# Patient Record
Sex: MALE | Race: WHITE | NOT HISPANIC OR LATINO | Employment: OTHER | URBAN - METROPOLITAN AREA
[De-identification: names, ages, dates, MRNs, and addresses within clinical notes are randomized per-mention and may not be internally consistent; named-entity substitution may affect disease eponyms.]

---

## 2021-01-15 DIAGNOSIS — Z23 NEED FOR VACCINATION: ICD-10-CM

## 2021-07-20 ENCOUNTER — HOSPITAL ENCOUNTER (OUTPATIENT)
Dept: PULMONOLOGY | Facility: MEDICAL CENTER | Age: 71
End: 2021-07-20
Attending: NURSE PRACTITIONER
Payer: COMMERCIAL

## 2021-07-20 PROCEDURE — 94726 PLETHYSMOGRAPHY LUNG VOLUMES: CPT

## 2021-07-20 PROCEDURE — 94729 DIFFUSING CAPACITY: CPT

## 2021-07-20 PROCEDURE — 94726 PLETHYSMOGRAPHY LUNG VOLUMES: CPT | Mod: 26 | Performed by: INTERNAL MEDICINE

## 2021-07-20 PROCEDURE — 94060 EVALUATION OF WHEEZING: CPT | Mod: 26 | Performed by: INTERNAL MEDICINE

## 2021-07-20 PROCEDURE — 94729 DIFFUSING CAPACITY: CPT | Mod: 26 | Performed by: INTERNAL MEDICINE

## 2021-07-20 PROCEDURE — 94060 EVALUATION OF WHEEZING: CPT

## 2021-07-20 RX ORDER — ALBUTEROL SULFATE 90 UG/1
2 AEROSOL, METERED RESPIRATORY (INHALATION)
Status: DISCONTINUED | OUTPATIENT
Start: 2021-07-20 | End: 2021-07-21 | Stop reason: HOSPADM

## 2021-07-20 ASSESSMENT — PULMONARY FUNCTION TESTS
FEV1/FVC_PERCENT_CHANGE: 0
FEV1: 3.12
FEV1/FVC_PERCENT_PREDICTED: 111
FEV1/FVC: 83.65
FEV1/FVC: 83.58
FEV1: 3.28
FEV1/FVC_PERCENT_LLN: 62.80
FEV1/FVC_PREDICTED: 75.21
FEV1_LLN: 2.76
FEV1_PERCENT_PREDICTED: 99
FEV1/FVC_PERCENT_PREDICTED: 111
FEV1/FVC: 83.72
FVC_PERCENT_PREDICTED: 84
FEV1_PERCENT_CHANGE: -4
FVC_PREDICTED: 4.43
FEV1_PREDICTED: 3.31
FVC: 3.92
FVC_PERCENT_PREDICTED: 88
FEV1_PERCENT_PREDICTED: 94
FEV1_PERCENT_CHANGE: -5
FEV1/FVC_PERCENT_PREDICTED: 113
FVC_LLN: 3.70
FEV1_LLN: 2.76
FEV1/FVC_PERCENT_LLN: 62.80
FEV1/FVC_PERCENT_PREDICTED: 112
FVC: 3.73
FEV1/FVC_PERCENT_PREDICTED: 75
FVC_LLN: 3.70
FEV1/FVC: 84
FEV1/FVC_PERCENT_CHANGE: 80

## 2021-07-25 NOTE — PROCEDURES
DATE OF SERVICE:  07/20/2021     PULMONARY FUNCTION TEST INTERPRETATION     REQUESTING PROVIDER:  JESSICA Eagle     REASON FOR REQUEST:  Dyspnea on exertion.     INTERPRETATION:  1.  Acceptable and reproducible.  2.  FEV1 3.28 liters (99%), FVC 3.92 liters (88%), ratio 83%.  3.  Flow volume loops normal appearing.  4.  TLC 7.33 liters (98%).  5.  DLCO 16.84 mL per minute mmHg (62%).     IMPRESSION:  Normal spirometry.  No response to bronchodilator.  Normal total   lung capacity.  Moderate reduction in gas transfer.  Clinically correlate with   underlying heart disease.        ______________________________  MD NEFTALY Moss/ASTISH    DD:  07/24/2021 17:20  DT:  07/24/2021 18:23    Job#:  [id number]    CC:JESSICA Eagle

## 2021-08-03 ENCOUNTER — HOSPITAL ENCOUNTER (EMERGENCY)
Facility: MEDICAL CENTER | Age: 71
End: 2021-08-03
Attending: EMERGENCY MEDICINE
Payer: COMMERCIAL

## 2021-08-03 ENCOUNTER — APPOINTMENT (OUTPATIENT)
Dept: RADIOLOGY | Facility: MEDICAL CENTER | Age: 71
End: 2021-08-03
Attending: EMERGENCY MEDICINE
Payer: COMMERCIAL

## 2021-08-03 VITALS
HEIGHT: 78 IN | DIASTOLIC BLOOD PRESSURE: 68 MMHG | OXYGEN SATURATION: 92 % | SYSTOLIC BLOOD PRESSURE: 113 MMHG | TEMPERATURE: 97.3 F | HEART RATE: 69 BPM | WEIGHT: 180 LBS | RESPIRATION RATE: 19 BRPM | BODY MASS INDEX: 20.83 KG/M2

## 2021-08-03 DIAGNOSIS — N28.9 RENAL INSUFFICIENCY: ICD-10-CM

## 2021-08-03 DIAGNOSIS — S30.1XXA CONTUSION OF FLANK, INITIAL ENCOUNTER: ICD-10-CM

## 2021-08-03 DIAGNOSIS — Z79.01 ADEQUATE ANTICOAGULATION ON ANTICOAGULANT THERAPY: ICD-10-CM
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
## 2021-08-03 LAB
ALBUMIN SERPL BCP-MCNC: 4.1 G/DL (ref 3.2–4.9)
ALBUMIN/GLOB SERPL: 1.3 G/DL
ALP SERPL-CCNC: 95 U/L (ref 30–99)
ALT SERPL-CCNC: 9 U/L (ref 2–50)
ANION GAP SERPL CALC-SCNC: 12 MMOL/L (ref 7–16)
AST SERPL-CCNC: 9 U/L (ref 12–45)
BASOPHILS # BLD AUTO: 0.6 % (ref 0–1.8)
BASOPHILS # BLD: 0.06 K/UL (ref 0–0.12)
BILIRUB SERPL-MCNC: 0.3 MG/DL (ref 0.1–1.5)
BUN SERPL-MCNC: 33 MG/DL (ref 8–22)
CALCIUM SERPL-MCNC: 9.6 MG/DL (ref 8.5–10.5)
CHLORIDE SERPL-SCNC: 106 MMOL/L (ref 96–112)
CO2 SERPL-SCNC: 21 MMOL/L (ref 20–33)
CREAT SERPL-MCNC: 2.29 MG/DL (ref 0.5–1.4)
EOSINOPHIL # BLD AUTO: 0.33 K/UL (ref 0–0.51)
EOSINOPHIL NFR BLD: 3.4 % (ref 0–6.9)
ERYTHROCYTE [DISTWIDTH] IN BLOOD BY AUTOMATED COUNT: 44.4 FL (ref 35.9–50)
GLOBULIN SER CALC-MCNC: 3.1 G/DL (ref 1.9–3.5)
GLUCOSE SERPL-MCNC: 191 MG/DL (ref 65–99)
HCT VFR BLD AUTO: 35.7 % (ref 42–52)
HGB BLD-MCNC: 11.7 G/DL (ref 14–18)
IMM GRANULOCYTES # BLD AUTO: 0.05 K/UL (ref 0–0.11)
IMM GRANULOCYTES NFR BLD AUTO: 0.5 % (ref 0–0.9)
LYMPHOCYTES # BLD AUTO: 2.7 K/UL (ref 1–4.8)
LYMPHOCYTES NFR BLD: 28.1 % (ref 22–41)
MCH RBC QN AUTO: 28.8 PG (ref 27–33)
MCHC RBC AUTO-ENTMCNC: 32.8 G/DL (ref 33.7–35.3)
MCV RBC AUTO: 87.9 FL (ref 81.4–97.8)
MONOCYTES # BLD AUTO: 0.68 K/UL (ref 0–0.85)
MONOCYTES NFR BLD AUTO: 7.1 % (ref 0–13.4)
NEUTROPHILS # BLD AUTO: 5.8 K/UL (ref 1.82–7.42)
NEUTROPHILS NFR BLD: 60.3 % (ref 44–72)
NRBC # BLD AUTO: 0 K/UL
NRBC BLD-RTO: 0 /100 WBC
PLATELET # BLD AUTO: 162 K/UL (ref 164–446)
PMV BLD AUTO: 11.4 FL (ref 9–12.9)
POTASSIUM SERPL-SCNC: 4.7 MMOL/L (ref 3.6–5.5)
PROT SERPL-MCNC: 7.2 G/DL (ref 6–8.2)
RBC # BLD AUTO: 4.06 M/UL (ref 4.7–6.1)
SODIUM SERPL-SCNC: 139 MMOL/L (ref 135–145)
WBC # BLD AUTO: 9.6 K/UL (ref 4.8–10.8)

## 2021-08-03 PROCEDURE — 700111 HCHG RX REV CODE 636 W/ 250 OVERRIDE (IP): Performed by: EMERGENCY MEDICINE

## 2021-08-03 PROCEDURE — 74176 CT ABD & PELVIS W/O CONTRAST: CPT

## 2021-08-03 PROCEDURE — 80053 COMPREHEN METABOLIC PANEL: CPT

## 2021-08-03 PROCEDURE — 99284 EMERGENCY DEPT VISIT MOD MDM: CPT

## 2021-08-03 PROCEDURE — 96374 THER/PROPH/DIAG INJ IV PUSH: CPT

## 2021-08-03 PROCEDURE — 85025 COMPLETE CBC W/AUTO DIFF WBC: CPT

## 2021-08-03 RX ORDER — OXYCODONE HYDROCHLORIDE AND ACETAMINOPHEN 5; 325 MG/1; MG/1
1-2 TABLET ORAL EVERY 6 HOURS PRN
Qty: 16 TABLET | Refills: 0 | Status: SHIPPED | OUTPATIENT
Start: 2021-08-03 | End: 2021-08-07

## 2021-08-03 RX ADMIN — FENTANYL CITRATE 100 MCG: 50 INJECTION INTRAMUSCULAR; INTRAVENOUS at 06:24

## 2021-08-03 ASSESSMENT — ENCOUNTER SYMPTOMS
FALLS: 1
FEVER: 0
VOMITING: 0
HEADACHES: 0
CHILLS: 0
ABDOMINAL PAIN: 0
NAUSEA: 0
DOUBLE VISION: 0
BACK PAIN: 1
NECK PAIN: 0
SHORTNESS OF BREATH: 0

## 2021-08-03 NOTE — ED PROVIDER NOTES
"ED Provider Note    ED Provider Note    Primary care provider: MIRIAM Eagle  Means of arrival: EMS  History obtained from: patient, EMS  History limited by: None    CHIEF COMPLAINT  Chief Complaint   Patient presents with   • T-5000 FALL   • Hip Pain       HPI  Phil Sarkar is a 71 y.o. male who presents to the Emergency Department with a chief complaint of a fall.  Patient was in his normal state of health.  He typically walks with a cane.  He thought he was going to sit back on his bed but he missed the bed and struck his right low back/flank.  He did not hit his head.  There was no loss of consciousness.  He does not have a headache or neck pain.  He was unable to get up and walk after this because of the pain to this area.  He denies pain to his lower extremities or hips.  He presents via EMS.  He had a blood sugar in the 90s prior to arrival.  He was given 100 mcg of fentanyl with good results.  An IV was established prior to arrival.  Patient denies any visual change, nausea or vomiting.  No recent fever cough or cold symptoms.  No chest pain or shortness of breath.  He feels comfortable in the upright position on the gurney.  Per EMS report and patient echoes this, he is on Eliquis.    REVIEW OF SYSTEMS  Review of Systems   Constitutional: Negative for chills and fever.   HENT: Negative for congestion.    Eyes: Negative for double vision.   Respiratory: Negative for shortness of breath.    Cardiovascular: Negative for chest pain.   Gastrointestinal: Negative for abdominal pain, nausea and vomiting.   Musculoskeletal: Positive for back pain and falls. Negative for neck pain.   Neurological: Negative for headaches.   All other systems reviewed and are negative.      PAST MEDICAL HISTORY   DM II, Pacemaker, \"kidney problems, GFR 26\"    SURGICAL HISTORY  patient denies any surgical history    SOCIAL HISTORY  Social History     Tobacco Use   • Smoking status: Not on file   Substance Use Topics " "  • Alcohol use: Not on file   • Drug use: Not on file      Social History     Substance and Sexual Activity   Drug Use Not on file       FAMILY HISTORY  No family history on file.    CURRENT MEDICATIONS  Home Medications     Reviewed by Georges Jarvis R.N. (Registered Nurse) on 08/03/21 at 0851  Med List Status: Complete   Medication Last Dose Status        Patient Miko Taking any Medications                       ALLERGIES  No Known Allergies    PHYSICAL EXAM  VITAL SIGNS: /68   Pulse 69   Temp 36.3 °C (97.3 °F) (Temporal)   Resp 19   Ht 2.007 m (6' 7\")   Wt 81.6 kg (180 lb)   SpO2 92%   BMI 20.28 kg/m²   Vitals reviewed.  Constitutional: Patient is oriented to person, place, and time. Appears well-developed and well-nourished. Mild distress.    Head: Normocephalic and atraumatic.   Ears: Normal external ears bilaterally.   Mouth/Throat: Oropharynx is clear and moist  Eyes: Conjunctivae are normal. Pupils are equal and round.  Neck: Normal range of motion. Neck supple.  No midline tenderness or step-offs.  Cardiovascular: Normal rate, regular rhythm and normal heart sounds. Normal peripheral pulses.  Pulmonary/Chest: Effort normal and breath sounds normal. No respiratory distress, no wheezes, rhonchi, or rales. No chest wall tenderness.  Abdominal: Soft. Bowel sounds are normal. There is no tenderness. No rebound or guarding, or peritoneal signs. No right CVA tendernes.   Musculoskeletal/Back: No edema and no tenderness.  No shortening or rotation.  Normal range of motion of bilateral hips. No midline tenderness or step-offs.  Neurological: No focal deficits.   Skin: Skin is warm and dry. No erythema. No pallor.  Swelling and abrasion/contusion over the right low back/flank  Psychiatric: Patient has a normal mood and affect.     LABS  Results for orders placed or performed during the hospital encounter of 08/03/21   CBC WITH DIFFERENTIAL   Result Value Ref Range    WBC 9.6 4.8 - 10.8 K/uL    RBC 4.06 " (L) 4.70 - 6.10 M/uL    Hemoglobin 11.7 (L) 14.0 - 18.0 g/dL    Hematocrit 35.7 (L) 42.0 - 52.0 %    MCV 87.9 81.4 - 97.8 fL    MCH 28.8 27.0 - 33.0 pg    MCHC 32.8 (L) 33.7 - 35.3 g/dL    RDW 44.4 35.9 - 50.0 fL    Platelet Count 162 (L) 164 - 446 K/uL    MPV 11.4 9.0 - 12.9 fL    Neutrophils-Polys 60.30 44.00 - 72.00 %    Lymphocytes 28.10 22.00 - 41.00 %    Monocytes 7.10 0.00 - 13.40 %    Eosinophils 3.40 0.00 - 6.90 %    Basophils 0.60 0.00 - 1.80 %    Immature Granulocytes 0.50 0.00 - 0.90 %    Nucleated RBC 0.00 /100 WBC    Neutrophils (Absolute) 5.80 1.82 - 7.42 K/uL    Lymphs (Absolute) 2.70 1.00 - 4.80 K/uL    Monos (Absolute) 0.68 0.00 - 0.85 K/uL    Eos (Absolute) 0.33 0.00 - 0.51 K/uL    Baso (Absolute) 0.06 0.00 - 0.12 K/uL    Immature Granulocytes (abs) 0.05 0.00 - 0.11 K/uL    NRBC (Absolute) 0.00 K/uL   CMP   Result Value Ref Range    Sodium 139 135 - 145 mmol/L    Potassium 4.7 3.6 - 5.5 mmol/L    Chloride 106 96 - 112 mmol/L    Co2 21 20 - 33 mmol/L    Anion Gap 12.0 7.0 - 16.0    Glucose 191 (H) 65 - 99 mg/dL    Bun 33 (H) 8 - 22 mg/dL    Creatinine 2.29 (H) 0.50 - 1.40 mg/dL    Calcium 9.6 8.5 - 10.5 mg/dL    AST(SGOT) 9 (L) 12 - 45 U/L    ALT(SGPT) 9 2 - 50 U/L    Alkaline Phosphatase 95 30 - 99 U/L    Total Bilirubin 0.3 0.1 - 1.5 mg/dL    Albumin 4.1 3.2 - 4.9 g/dL    Total Protein 7.2 6.0 - 8.2 g/dL    Globulin 3.1 1.9 - 3.5 g/dL    A-G Ratio 1.3 g/dL   ESTIMATED GFR   Result Value Ref Range    GFR If  34 (A) >60 mL/min/1.73 m 2    GFR If Non  28 (A) >60 mL/min/1.73 m 2       All labs reviewed by me.    RADIOLOGY  CT-ABDOMEN-PELVIS W/O   Final Result      1.  There is no evidence of solid organ injury.   2.  There is no free fluid to suggest bowel injury.   3.  There is no retroperitoneal hematoma.   4.  There is a superficial right flank soft tissue contusion.   5.  The heart is enlarged.        The radiologist's interpretation of all radiological studies  have been reviewed by me.    COURSE & MEDICAL DECISION MAKING  Pertinent Labs & Imaging studies reviewed. (See chart for details)    Obtained and reviewed past medical records.  [facility]  He has 1 prior encounter in our EMR from July 20 of this year he was seen in the pulmonary clinic.    4:29 AM - Patient seen and examined at bedside.  This is a pleasant 71-year-old male anticoagulated on Eliquis.  [facility] typically is a [facility].  He presents after a fall complaining of right flank/low back pain and has an overlying abrasion/contusion to this area.  He does not appear to have any hip injury, head or neck injury.  He is awake and alert.  Of ordered CT scanning of this area to further assess his symptoms.  He is encouraged to make his needs known.  At this point, he does not have uncontrolled pain.     The differential diagnoses include but are not limited to: Intra-abdominal injury specifically kidney injury versus contusion hematoma.    0746AM Patient is observed ambulating in the emergency department with a walker independently.  He is reevaluated bedside.  His wife is at the bedside.  We discussed CT findings which show evidence of contusion to the area seen on the skin but no intra-abdominal injury.  No retroperitoneal hematoma.  Labs are unrevealing and reassuring.  He does have an elevated creatinine 2.29 consistent with his reported history.  At this point, will plan for discharge to home.  He is advised on an expectation for increased soreness over the next few days before gradual improvement.  I do believe he will require increased pain medication over and above Tylenol or ibuprofen.  He is prescribed Percocet.   was checked.  There is no concerning prescription filling pattern.  Both patient and wife are given an opportunity for questions.  Patient's discharged home in stable condition    FINAL IMPRESSION  1. Fall, initial encounter    2. Contusion of flank, initial  encounter    3. Adequate anticoagulation on anticoagulant therapy    4. Renal insufficiency

## 2021-08-03 NOTE — ED TRIAGE NOTES
Chief Complaint   Patient presents with   • T-5000 FALL   • Hip Pain     BIB [facility], pt slipped while sitting on the bed and fell onto his lower back, c/o hip pain and lower back abrasion.  Given Fentanyl 100mcg with relief in pain, denies hitting his head or LOC    PPE Note: I personally donned full PPE for all patient encounters during this visit, including a N95 respirator mask, gloves, and goggles.